# Patient Record
Sex: FEMALE | Race: WHITE | URBAN - METROPOLITAN AREA
[De-identification: names, ages, dates, MRNs, and addresses within clinical notes are randomized per-mention and may not be internally consistent; named-entity substitution may affect disease eponyms.]

---

## 2017-03-24 ENCOUNTER — HOSPITAL ENCOUNTER (EMERGENCY)
Facility: CLINIC | Age: 42
Discharge: HOME | End: 2017-03-24
Attending: FAMILY MEDICINE | Admitting: FAMILY MEDICINE

## 2017-03-24 VITALS
WEIGHT: 190 LBS | DIASTOLIC BLOOD PRESSURE: 83 MMHG | BODY MASS INDEX: 29.98 KG/M2 | RESPIRATION RATE: 18 BRPM | TEMPERATURE: 97.7 F | SYSTOLIC BLOOD PRESSURE: 131 MMHG | OXYGEN SATURATION: 97 %

## 2017-03-24 DIAGNOSIS — S92.504A CLOSED NONDISPLACED FRACTURE OF PHALANX OF LESSER TOE OF RIGHT FOOT, UNSPECIFIED PHALANX, INITIAL ENCOUNTER: ICD-10-CM

## 2017-03-24 PROCEDURE — 99213 OFFICE O/P EST LOW 20 MIN: CPT | Performed by: FAMILY MEDICINE

## 2017-03-24 PROCEDURE — 99213 OFFICE O/P EST LOW 20 MIN: CPT

## 2017-03-24 ASSESSMENT — ENCOUNTER SYMPTOMS
NAUSEA: 0
BLOOD IN STOOL: 0
CONSTIPATION: 0
WHEEZING: 0
HEADACHES: 0
VOMITING: 0
DYSURIA: 0
DIARRHEA: 0
COUGH: 0
FEVER: 0
SHORTNESS OF BREATH: 0
SORE THROAT: 0
FREQUENCY: 0
ABDOMINAL PAIN: 0
PALPITATIONS: 0
DIAPHORESIS: 0
CHILLS: 0
SINUS PRESSURE: 0

## 2017-03-24 NOTE — ED AVS SNAPSHOT
[facility] Emergency Department    [address] [facility] [address]    Phone:  [phone]    Fax:  [phone]                                       Kristen De La Cruz   MRN: [id number]    Department:  [facility] Emergency Department   Date of Visit:  3/24/2017           Patient Information     Date Of Birth          1975        Your diagnoses for this visit were:     Closed nondisplaced fracture of phalanx of lesser toe of right foot, unspecified phalanx, initial encounter follow-up as needed.  use crutches if painful.  avoid repeat trauma to the area.  ibuprofen as needed.       You were seen by Eyad Mireles MD.      Follow-up Information     Follow up with primary doctor In 1 week.        Follow up with [facility] Emergency Department.    Specialty:  EMERGENCY MEDICINE    Why:  As needed, If symptoms worsen    Contact information:    [address] [place] [id number] [phone]    Additional information:    The medical center is located at   [address] [place] [address] (between [facility] and   [facility] in [place], four miles north   of [place]).        Discharge Instructions         ICD-10-CM    1. Closed nondisplaced fracture of phalanx of lesser toe of right foot, unspecified phalanx, initial encounter S92.504A Alum Crutches: Adult    follow-up as needed.  use crutches if painful.  avoid repeat trauma to the area.  ibuprofen as needed.         Closed Toe Fracture  Your big toe is broken (fractured). This causes local pain, swelling, and bruising. This injury takes about 4 weeks to heal. Toe injuries are often treated by taping the injured toe to the next one (buddy taping). This protects the injured toe and holds it in position.    If the toenail has been severely injured, it may fall off in 1 to 2 weeks. It takes up to 12 months for a new toenail to grow back.  Home care  Follow these guidelines when caring for yourself at home:    You may be given a cast shoe to wear  to keep your toe from moving. If not, you can use a sandal or any shoe that doesn t put pressure on the injured toe until the swelling and pain go away. If using a sandal, be careful not to strike your foot against anything. Another injury could make the fracture worse. If you were given crutches, don t put full weight on the injured foot until you can do so without pain.    Keep your foot elevated to reduce pain and swelling. When sleeping, put a pillow under the injured leg. When sitting, support the injured leg so it is level with your waist. This is very important during the first 2 days (48 hours).    Put an ice pack on the injured area. Do this for 20 minutes every 1 to 2 hours the first day for pain relief. You can make an ice pack by wrapping a plastic bag of ice cubes in a thin towel. As the ice melts, be careful that any cloth or paper tape doesn t get wet. Continue using the ice pack 3 to 4 times a day for the next 2 days. Then use the ice pack as needed to ease pain and swelling.    If buddy tape was used and it becomes wet or dirty, change it. You may replace it with paper, plastic, or cloth tape. Cloth tape and paper tapes must be kept dry.    You may use acetaminophen or ibuprofen to control pain, unless another pain medicine was prescribed. If you have chronic liver or kidney disease, talk with your health care provider before using these medicines. Also talk with your provider if you ve had a stomach ulcer or GI bleeding.    You may return to sports or physical education activities after 4 weeks, or when you can run without pain.  Follow-up care  Follow up with your health care provider in 1 week, or as advised. This is to make sure the bone is healing the way it should.  If X-rays were taken, a radiologist will look at them. You will be told of any new findings that may affect your care.  When to seek medical advice  Call your health care provider right away if any of these occur:    Pain or  swelling gets worse    The cast cracks    The cast and padding get wet and stays wet more than 24 hours    Bad odor from the cast or wound fluid stains the cast    Tightness or pressure under the cast gets worse    Toe becomes cold, blue, numb, or tingly    You can t move the toe    Signs of infection: fever, redness, warmth, swelling, or drainage from the wound or cast    Fever of 101 F (38.3 C) or higher, or as directed by your health care provider    7803-9513 [employer]. [address] [place] [address]. All rights reserved. This information is not intended as a substitute for professional medical care. Always follow your healthcare professional's instructions.          24 Hour Appointment Hotline       To make an appointment at any [facility] clinic, call [phone] ([phone]). If you don't have a family doctor or clinic, we will help you find one. [facility] clinics are conveniently located to serve the needs of you and your family.          ED Discharge Orders     Alum Crutches: Adult       Use gait belt during crutch training.                     Review of your medicines      Our records show that you are taking the medicines listed below. If these are incorrect, please call your family doctor or clinic.        Dose / Directions Last dose taken    citalopram 20 MG tablet   Commonly known as:  celeXA   Quantity:  30        1 tablet by mouth daily   Refills:  12        ZOFRAN 4 MG tablet   Quantity:  18   Generic drug:  ondansetron        one to two tablets every 4 hours as needed.   Refills:  0                Orders Needing Specimen Collection     None      Pending Results     No orders found from 3/22/2017 to 3/25/2017.            Pending Culture Results     No orders found from 3/22/2017 to 3/25/2017.             Test Results from your hospital stay            Thank you for choosing [facility]       Thank you for choosing [facility] for your care. Our goal is always to provide you  "with excellent care. Hearing back from our patients is one way we can continue to improve our services. Please take a few minutes to complete the written survey that you may receive in the mail after you visit with us. Thank you!        [employer] Information     [employer] lets you send messages to your doctor, view your test results, renew your prescriptions, schedule appointments and more. To sign up, go to [email or web address] . Click on \"Log in\" on the left side of the screen, which will take you to the Welcome page. Then click on \"Sign up Now\" on the right side of the page.     You will be asked to enter the access code listed below, as well as some personal information. Please follow the directions to create your username and password.     Your access code is: [email or web address]  Expires: 2017  1:25 PM     Your access code will  in 90 days. If you need help or a new code, please call your [facility] clinic or [phone].        Care EveryWhere ID     This is your Care EveryWhere ID. This could be used by other organizations to access your [facility] medical records  [id number]        After Visit Summary       This is your record. Keep this with you and show to your community pharmacist(s) and doctor(s) at your next visit.                  "

## 2017-03-24 NOTE — ED AVS SNAPSHOT
[facility] Emergency Department    [address] [facility] [address]    Phone:  [phone]    Fax:  [phone]                                       Kristen De La Cruz   MRN: [id number]    Department:  [facility] Emergency Department   Date of Visit:  3/24/2017           After Visit Summary Signature Page     I have received my discharge instructions, and my questions have been answered. I have discussed any challenges I see with this plan with the nurse or doctor.    ..........................................................................................................................................  Patient/Patient Representative Signature      ..........................................................................................................................................  Patient Representative Print Name and Relationship to Patient    ..................................................               ................................................  Date                                            Time    ..........................................................................................................................................  Reviewed by Signature/Title    ...................................................              ..............................................  Date                                                            Time

## 2017-03-24 NOTE — ED PROVIDER NOTES
History     Chief Complaint   Patient presents with     Foot Pain     recent fx of toe on right foot, pt continues to have pain and unable to get into ortho next week.     HPI  Kristen De La Cruz is a 41 year old female who presents after injury to the fifth toe on the right foot and seen on 3/17/2017 for the injury.  XRay demonstrated a non-displaced fracture.  using a cast boot and no crutches and has been walking on this since.      also seen on 3/11 for heroin overdose in [facility] system - had admitted to heroin and benzo overuse.  is on chronic clonazepam and xanax    Patient Active Problem List   Diagnosis     Metrorrhagia     Varicose veins of lower extremities with complications     Depressive disorder, not elsewhere classified     Generalized anxiety disorder     CARDIOVASCULAR SCREENING; LDL GOAL LESS THAN 160     Current Outpatient Prescriptions   Medication Sig Dispense Refill     CITALOPRAM HYDROBROMIDE 20 MG OR TABS 1 tablet by mouth daily  30 12     ZOFRAN 4 MG OR TABS one to two tablets every 4 hours as needed. 18 0      No Known Allergies      I have reviewed the Medications, Allergies, Past Medical and Surgical History, and Social History in the Epic system.    Review of Systems   Constitutional: Negative for chills, diaphoresis and fever.   HENT: Negative for ear pain, sinus pressure and sore throat.    Eyes: Negative for visual disturbance.   Respiratory: Negative for cough, shortness of breath and wheezing.    Cardiovascular: Negative for chest pain and palpitations.   Gastrointestinal: Negative for abdominal pain, blood in stool, constipation, diarrhea, nausea and vomiting.   Genitourinary: Negative for dysuria, frequency and urgency.   Skin: Negative for rash.   Neurological: Negative for headaches.   All other systems reviewed and are negative.         Physical Exam   BP: 131/83  Heart Rate: 83  Temp: 97.7  F (36.5  C)  Resp: 18  Weight: 86.2 kg (190 lb)  SpO2: 97 %  Physical Exam    The 5th  distal toe is tender to palpation without obvious ecchymosis or swelling.  There is no deformity.  She has good cap refill distally.  All toes with good range of motion.  There is no tender palpation over the Lisfranc joint or 5th metatarsal base or over the navicular.  Normal distal pulses dorsalis pedis posterior tibial.  Normal coloration.  The ankle is nontender palpation with normal talar tilt and anterior drawer testing.  There is no swelling of the calf or tenderness to palpation over the tib-fib.    ED Course     ED Course     Procedures             Critical Care time:  none               Labs Ordered and Resulted from Time of ED Arrival Up to the Time of Departure from the ED - No data to display    Assessments & Plan (with Medical Decision Making)     MDM: Kristen De La Cruz is a 41 year old female who presented with 5th toe distal pain after she had a nondisplaced fracture seen at [facility].  I cannot see that imaging however I read the discharge paperwork.  She has follow-up with a podiatrist.  She is in a cast boot.  She tells me that she's been walking on this since been associated with increased pain.  I asked her to use crutches to take pressure off this area.  We also discussed that she was evaluated only 5-6 days ago for a heroin overdose at which excessively used both heroin and prescribed benzodiazepines.  She tells me that she was given resources for this as well.      I have reviewed the nursing notes.    I have reviewed the findings, diagnosis, plan and need for follow up with the patient.    New Prescriptions    No medications on file       Final diagnoses:   Closed nondisplaced fracture of phalanx of lesser toe of right foot, unspecified phalanx, initial encounter - follow-up as needed.  use crutches if painful.  avoid repeat trauma to the area.  ibuprofen as needed.       3/24/2017   [facility] EMERGENCY DEPARTMENT     Eyad Mireles MD  03/24/17 8098

## 2017-03-24 NOTE — DISCHARGE INSTRUCTIONS
ICD-10-CM    1. Closed nondisplaced fracture of phalanx of lesser toe of right foot, unspecified phalanx, initial encounter S92.504A Alum Crutches: Adult    follow-up as needed.  use crutches if painful.  avoid repeat trauma to the area.  ibuprofen as needed.         Closed Toe Fracture  Your big toe is broken (fractured). This causes local pain, swelling, and bruising. This injury takes about 4 weeks to heal. Toe injuries are often treated by taping the injured toe to the next one (buddy taping). This protects the injured toe and holds it in position.    If the toenail has been severely injured, it may fall off in 1 to 2 weeks. It takes up to 12 months for a new toenail to grow back.  Home care  Follow these guidelines when caring for yourself at home:    You may be given a cast shoe to wear to keep your toe from moving. If not, you can use a sandal or any shoe that doesn t put pressure on the injured toe until the swelling and pain go away. If using a sandal, be careful not to strike your foot against anything. Another injury could make the fracture worse. If you were given crutches, don t put full weight on the injured foot until you can do so without pain.    Keep your foot elevated to reduce pain and swelling. When sleeping, put a pillow under the injured leg. When sitting, support the injured leg so it is level with your waist. This is very important during the first 2 days (48 hours).    Put an ice pack on the injured area. Do this for 20 minutes every 1 to 2 hours the first day for pain relief. You can make an ice pack by wrapping a plastic bag of ice cubes in a thin towel. As the ice melts, be careful that any cloth or paper tape doesn t get wet. Continue using the ice pack 3 to 4 times a day for the next 2 days. Then use the ice pack as needed to ease pain and swelling.    If buddy tape was used and it becomes wet or dirty, change it. You may replace it with paper, plastic, or cloth tape. Cloth tape and  paper tapes must be kept dry.    You may use acetaminophen or ibuprofen to control pain, unless another pain medicine was prescribed. If you have chronic liver or kidney disease, talk with your health care provider before using these medicines. Also talk with your provider if you ve had a stomach ulcer or GI bleeding.    You may return to sports or physical education activities after 4 weeks, or when you can run without pain.  Follow-up care  Follow up with your health care provider in 1 week, or as advised. This is to make sure the bone is healing the way it should.  If X-rays were taken, a radiologist will look at them. You will be told of any new findings that may affect your care.  When to seek medical advice  Call your health care provider right away if any of these occur:    Pain or swelling gets worse    The cast cracks    The cast and padding get wet and stays wet more than 24 hours    Bad odor from the cast or wound fluid stains the cast    Tightness or pressure under the cast gets worse    Toe becomes cold, blue, numb, or tingly    You can t move the toe    Signs of infection: fever, redness, warmth, swelling, or drainage from the wound or cast    Fever of 101 F (38.3 C) or higher, or as directed by your health care provider    2623-6474 [employer]. [address] [place] [address], [place] [address]. All rights reserved. This information is not intended as a substitute for professional medical care. Always follow your healthcare professional's instructions.

## 2017-03-24 NOTE — ED NOTES
"Patient states \"I was told to come into the urgent care because I couldn't get into the foot specialist till next week and I'm continuing to have pain. I have a fracture in my foot that happened on the 17th.\"   Patient is ambulatory upon arrival to urgent care and is utilizing a post op shoe on her right foot.   "

## 2017-07-29 ENCOUNTER — HEALTH MAINTENANCE LETTER (OUTPATIENT)
Age: 42
End: 2017-07-29

## 2017-08-22 ENCOUNTER — OFFICE VISIT (OUTPATIENT)
Dept: FAMILY MEDICINE | Facility: CLINIC | Age: 42
End: 2017-08-22

## 2017-08-22 ENCOUNTER — TRANSFERRED RECORDS (OUTPATIENT)
Dept: HEALTH INFORMATION MANAGEMENT | Facility: CLINIC | Age: 42
End: 2017-08-22

## 2017-08-22 VITALS
DIASTOLIC BLOOD PRESSURE: 84 MMHG | BODY MASS INDEX: 33.34 KG/M2 | HEART RATE: 80 BPM | OXYGEN SATURATION: 98 % | WEIGHT: 220 LBS | TEMPERATURE: 97.6 F | HEIGHT: 68 IN | SYSTOLIC BLOOD PRESSURE: 124 MMHG | RESPIRATION RATE: 16 BRPM

## 2017-08-22 DIAGNOSIS — G89.29 CHRONIC BILATERAL LOW BACK PAIN WITH RIGHT-SIDED SCIATICA: Primary | ICD-10-CM

## 2017-08-22 DIAGNOSIS — M54.41 CHRONIC BILATERAL LOW BACK PAIN WITH RIGHT-SIDED SCIATICA: Primary | ICD-10-CM

## 2017-08-22 PROCEDURE — 99213 OFFICE O/P EST LOW 20 MIN: CPT | Performed by: OBSTETRICS & GYNECOLOGY

## 2017-08-22 ASSESSMENT — PAIN SCALES - GENERAL: PAINLEVEL: EXTREME PAIN (8)

## 2017-08-22 NOTE — MR AVS SNAPSHOT
After Visit Summary   8/22/2017    Kristen De La Cruz    MRN: [id number]           Patient Information     Date Of Birth          1975        Visit Information        Provider Department      8/22/2017 8:50 AM Joel Alfaro MD [facility]        Today's Diagnoses     Chronic bilateral low back pain with right-sided sciatica    -  1       Follow-ups after your visit        Additional Services     NEUROSURGERY REFERRAL       Your provider has referred you to: [facility]: [facility]uro[facility] Clinic [phone]   [email or web address]    Please be aware that coverage of these services is subject to the terms and limitations of your health insurance plan.  Call member services at your health plan with any benefit or coverage questions.      Please bring the following with you to your appointment:    (1) Any X-Rays, CTs or MRIs which have been performed.  Contact the facility where they were done to arrange for  prior to your scheduled appointment.   (2) List of current medications  (3) This referral request   (4) Any documents/labs given to you for this referral                  Who to contact     If you have questions or need follow up information about today's clinic visit or your schedule please contact [facility] directly at [phone].  Normal or non-critical lab and imaging results will be communicated to you by MyChart, letter or phone within 4 business days after the clinic has received the results. If you do not hear from us within 7 days, please contact the clinic through MyChart or phone. If you have a critical or abnormal lab result, we will notify you by phone as soon as possible.  Submit refill requests through [employer] or call your pharmacy and they will forward the refill request to us. Please allow 3 business days for your refill to be completed.          Additional Information About  "Your Visit        [employer] Information     [employer] lets you send messages to your doctor, view your test results, renew your prescriptions, schedule appointments and more. To sign up, go to [email or web address] . Click on \"Log in\" on the left side of the screen, which will take you to the Welcome page. Then click on \"Sign up Now\" on the right side of the page.     You will be asked to enter the access code listed below, as well as some personal information. Please follow the directions to create your username and password.     Your access code is: [email or web address]  Expires: 2017  9:24 AM     Your access code will  in 90 days. If you need help or a new code, please call your [facility] clinic or [phone].        Care EveryWhere ID     This is your Care EveryWhere ID. This could be used by other organizations to access your [facility] medical records  [id number]        Your Vitals Were     Pulse Temperature Respirations Height Pulse Oximetry Breastfeeding?    80 97.6  F (36.4  C) (Tympanic) 16 5' 8\" (1.727 m) 98% No    BMI (Body Mass Index)                   33.45 kg/m2            Blood Pressure from Last 3 Encounters:   17 124/84   17 131/83   09 114/77    Weight from Last 3 Encounters:   17 220 lb (99.8 kg)   17 190 lb (86.2 kg)   09 204 lb 6.4 oz (92.7 kg)              We Performed the Following     NEUROSURGERY REFERRAL        Primary Care Provider Office Phone # Fax #    Kena Charles [phone] [address] [facility] [address]        Equal Access to Services     MARLENA PATEL : Mingo Marcus, anderson ramirez, blanche mancilla, yessica johnson. So [facility] [phone].    ATENCIÓN: Si habla español, tiene a rodriguez disposición servicios gratuitos de asistencia lingüística. Llame al [phone].    We comply with applicable federal civil rights laws and [place] laws. We do not discriminate " on the basis of race, color, national origin, age, disability sex, sexual orientation or gender identity.            Thank you!     Thank you for choosing [facility]  for your care. Our goal is always to provide you with excellent care. Hearing back from our patients is one way we can continue to improve our services. Please take a few minutes to complete the written survey that you may receive in the mail after your visit with us. Thank you!             Your Updated Medication List - Protect others around you: Learn how to safely use, store and throw away your medicines at www.disposemymeds.org.          This list is accurate as of: 8/22/17  9:26 AM.  Always use your most recent med list.                   Brand Name Dispense Instructions for use Diagnosis    CYCLOBENZAPRINE HCL PO      Take 10 mg by mouth 3 times daily as needed for muscle spasms        NAPROXEN PO      Take 500 mg by mouth 2 times daily (with meals)

## 2017-08-22 NOTE — PROGRESS NOTES
Subjective: she was seen at [facility] several months ago with right sided lower back pain and sciatica with right sided upper thigh pain and tingling- the sx havent improved. She does not recall a precipitating injury or incident.  No loss of bowel or bladder control.       The past medical history, social history, past surgical history and family history as shown below have been reviewed by me today.  History reviewed. No pertinent past medical history.   No Known Allergies  Current Outpatient Prescriptions   Medication Sig Dispense Refill     CYCLOBENZAPRINE HCL PO Take 10 mg by mouth 3 times daily as needed for muscle spasms       NAPROXEN PO Take 500 mg by mouth 2 times daily (with meals)       Past Surgical History:   Procedure Laterality Date     HC CREATE EARDRUM OPENING,GEN ANESTH      P.E. Tubes     SURGICAL HISTORY OF -   01/11/2002    laparoscopic tubal ligation with Filshie clips     Social History     Social History     Marital status:      Spouse name: N/A     Number of children: N/A     Years of education: N/A     Social History Main Topics     Smoking status: Former Smoker     Quit date: 10/1/2006     Smokeless tobacco: Never Used     Alcohol use No     Drug use: No     Sexual activity: Yes     Partners: Male     Birth control/ protection: Surgical     Other Topics Concern     None     Social History Narrative    ** Merged History Encounter **          Family History   Problem Relation Age of Onset     DIABETES Mother      Thyroid Disease Mother      Depression Mother      DIABETES Maternal Grandmother      CANCER Maternal Grandfather      lung     Depression Father      Alzheimer Disease Paternal Grandmother      Depression Brother      Breast Cancer No family hx of      Cancer - colorectal No family hx of        ROS: A 12 point review of systems was done. Except for what is listed above in the HPI, the systems review is negative .      Objective: Vital signs: Blood pressure 124/84, pulse 80,  "temperature 97.6  F (36.4  C), temperature source Tympanic, resp. rate 16, height 5' 8\" (1.727 m), weight 220 lb (99.8 kg), SpO2 98 %, not currently breastfeeding.    HEENT normal.Neck is supple, mobile, no adenopathy or masses palpable. The thyroid feels normal.   Normal range of motion noted.  Chest is clear to auscultation.  No wheezes, rales or rhonchi heard.  Cardiac exam is normal with s1, s2, no murmurs or adventitious sounds.Normal rate and rhythm is heard.   The back is tender in the lower lumbar area on the right side. Also some midline pain to palpation. SLR + at 30 degrees on the right and 60 degrees on the left.  DTRs 1+ on both knees and ankles.  She has normal sensation in legs  And walks without a limp.          Assessment/Plan:    1. Low back pain with sciatica sx to the right knee- and some tingling- I wonder about a disc herniation- right sided- lower lumbar- L3-4 or L4-5    2. I advised  MRI scan to r/o disc herniation before deciding any therapy- she has taken flexeril and naproxen- getting some GI upset from the naproxen so I advised her to stop it.    3. Discussed cauda equina syndrome- rechekc acutely or in ER if loss of bowel or bladder control or if sx worsen.        BRUNA Alfaro MD              "

## 2017-08-22 NOTE — NURSING NOTE
"Chief Complaint   Patient presents with     RECHECK     ER back pain       Initial /84 (BP Location: Right arm, Patient Position: Chair, Cuff Size: Adult Large)  Pulse 80  Temp 97.6  F (36.4  C) (Tympanic)  Resp 16  Ht 5' 8\" (1.727 m)  Wt 220 lb (99.8 kg)  SpO2 98%  Breastfeeding? No  BMI 33.45 kg/m2 Estimated body mass index is 33.45 kg/(m^2) as calculated from the following:    Height as of this encounter: 5' 8\" (1.727 m).    Weight as of this encounter: 220 lb (99.8 kg)..   BP completed using cuff size: large  Medication Rec Completed    Paula Bond CMA    "

## 2017-09-01 ENCOUNTER — OFFICE VISIT (OUTPATIENT)
Dept: NEUROSURGERY | Facility: CLINIC | Age: 42
End: 2017-09-01
Attending: OBSTETRICS & GYNECOLOGY

## 2017-09-01 VITALS
DIASTOLIC BLOOD PRESSURE: 64 MMHG | SYSTOLIC BLOOD PRESSURE: 110 MMHG | BODY MASS INDEX: 33.8 KG/M2 | WEIGHT: 223 LBS | HEIGHT: 68 IN

## 2017-09-01 DIAGNOSIS — M54.50 ACUTE BILATERAL LOW BACK PAIN WITHOUT SCIATICA: Primary | ICD-10-CM

## 2017-09-01 PROCEDURE — 99204 OFFICE O/P NEW MOD 45 MIN: CPT | Performed by: PHYSICIAN ASSISTANT

## 2017-09-01 ASSESSMENT — PAIN SCALES - GENERAL: PAINLEVEL: EXTREME PAIN (8)

## 2017-09-01 NOTE — MR AVS SNAPSHOT
"              After Visit Summary   9/1/2017    Kristen De La Cruz    MRN: [id number]           Patient Information     Date Of Birth          1975        Visit Information        Provider Department      9/1/2017 10:50 AM Malick Bar PA-C [facility]        Today's Diagnoses     Acute bilateral low back pain without sciatica    -  1       Follow-ups after your visit        Additional Services     PHYSICAL THERAPY REFERRAL       *This therapy referral will be filtered to a centralized scheduling office at [facility] and the patient will receive a call to schedule an appointment at a [facility] location most convenient for them. *     [facility] provides Physical Therapy evaluation and treatment and many specialty services across the [facility] system.  If requesting a specialty program, please choose from the list below.    If you have not heard from the scheduling office within 2 business days, please call [phone] for all locations, with the exception of [place], please call [phone].  Treatment: Evaluation & Treatment  Special Instructions/Modalities: back pain, musculoskeletal  Special Programs: whiplash, soft tissue injuries    Please be aware that coverage of these services is subject to the terms and limitations of your health insurance plan.  Call member services at your health plan with any benefit or coverage questions.      **Note to Provider:  If you are referring outside of [facility] for the therapy appointment, please list the name of the location in the \"special instructions\" above, print the referral and give to the patient to schedule the appointment.                  Who to contact     If you have questions or need follow up information about today's clinic visit or your schedule please contact [facility] directly at [phone].  Normal or non-critical lab and imaging results will be communicated to you by Jonatan, " "letter or phone within 4 business days after the clinic has received the results. If you do not hear from us within 7 days, please contact the clinic through [email or web address] or phone. If you have a critical or abnormal lab result, we will notify you by phone as soon as possible.  Submit refill requests through [email or web address] or call your pharmacy and they will forward the refill request to us. Please allow 3 business days for your refill to be completed.          Additional Information About Your Visit        [email or web address] Information     [email or web address] lets you send messages to your doctor, view your test results, renew your prescriptions, schedule appointments and more. To sign up, go to [email or web address] . Click on \"Log in\" on the left side of the screen, which will take you to the Welcome page. Then click on \"Sign up Now\" on the right side of the page.     You will be asked to enter the access code listed below, as well as some personal information. Please follow the directions to create your username and password.     Your access code is: [email or web address]  Expires: 2017  9:24 AM     Your access code will  in 90 days. If you need help or a new code, please call your [facility] clinic or [phone].        Care EveryWhere ID     This is your Care EveryWhere ID. This could be used by other organizations to access your [facility] medical records  [id number]        Your Vitals Were     Height BMI (Body Mass Index)                5' 8\" (1.727 m) 33.91 kg/m2           Blood Pressure from Last 3 Encounters:   17 110/64   17 124/84   17 131/83    Weight from Last 3 Encounters:   17 223 lb (101.2 kg)   17 220 lb (99.8 kg)   17 190 lb (86.2 kg)              We Performed the Following     PHYSICAL THERAPY REFERRAL        Primary Care Provider Office Phone # Fax #    Kena Charles [phone] [address] [facility] [address]        Equal Access to Services     TOSHIA PATEL" AH: Mingo calabrese Jennifernery, waemilida luqadaha, qaybta karea rubentommy, waxmilly david hayluz joaystephanymelissa johnson. So [facility] [phone].    ATENCIÓN: Si habla español, tiene a rodriguez disposición servicios gratuitos de asistencia lingüística. Llame al [phone].    We comply with applicable federal civil rights laws and [place] laws. We do not discriminate on the basis of race, color, national origin, age, disability sex, sexual orientation or gender identity.            Thank you!     Thank you for choosing [facility]  for your care. Our goal is always to provide you with excellent care. Hearing back from our patients is one way we can continue to improve our services. Please take a few minutes to complete the written survey that you may receive in the mail after your visit with us. Thank you!             Your Updated Medication List - Protect others around you: Learn how to safely use, store and throw away your medicines at www.disposemymeds.org.          This list is accurate as of: 9/1/17 11:10 AM.  Always use your most recent med list.                   Brand Name Dispense Instructions for use Diagnosis    CYCLOBENZAPRINE HCL PO      Take 10 mg by mouth 3 times daily as needed for muscle spasms
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
## 2017-09-01 NOTE — PROGRESS NOTES
"Kristen De La Cruz is a 42 year old female who presents for:  Chief Complaint   Patient presents with     Neurologic Problem     Low back pain with right leg pain        Initial Vitals:  /64 (BP Location: Right arm, Patient Position: Chair, Cuff Size: Adult Regular)  Ht 1.727 m (5' 8\")  Wt 101.2 kg (223 lb)  BMI 33.91 kg/m2 Estimated body mass index is 33.91 kg/(m^2) as calculated from the following:    Height as of this encounter: 1.727 m (5' 8\").    Weight as of this encounter: 101.2 kg (223 lb).. Body surface area is 2.2 meters squared. BP completed using cuff size: regular  Extreme Pain (8)    Do you feel safe in your environment?  Yes  Do you need any refills today? No    Nursing Comments:         Melisa Swan CMA  "

## 2017-09-23 ENCOUNTER — HOSPITAL ENCOUNTER (EMERGENCY)
Facility: CLINIC | Age: 42
Discharge: HOME | End: 2017-09-23
Attending: NURSE PRACTITIONER | Admitting: NURSE PRACTITIONER

## 2017-09-23 VITALS
RESPIRATION RATE: 20 BRPM | SYSTOLIC BLOOD PRESSURE: 120 MMHG | TEMPERATURE: 97.6 F | BODY MASS INDEX: 30.31 KG/M2 | HEIGHT: 68 IN | HEART RATE: 117 BPM | OXYGEN SATURATION: 100 % | DIASTOLIC BLOOD PRESSURE: 78 MMHG | WEIGHT: 200 LBS
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
## 2017-09-23 DIAGNOSIS — M62.838 MUSCLE SPASMS OF NECK: ICD-10-CM

## 2017-09-23 LAB
ANION GAP SERPL CALCULATED.3IONS-SCNC: 11 MMOL/L (ref 3–14)
BASOPHILS # BLD AUTO: 0 10E9/L (ref 0–0.2)
BASOPHILS NFR BLD AUTO: 0.3 %
BUN SERPL-MCNC: 16 MG/DL (ref 7–30)
CALCIUM SERPL-MCNC: 8.4 MG/DL (ref 8.5–10.1)
CHLORIDE SERPL-SCNC: 111 MMOL/L (ref 94–109)
CO2 SERPL-SCNC: 24 MMOL/L (ref 20–32)
CREAT SERPL-MCNC: 0.85 MG/DL (ref 0.52–1.04)
CRP SERPL-MCNC: <2.9 MG/L (ref 0–8)
DIFFERENTIAL METHOD BLD: ABNORMAL
EOSINOPHIL # BLD AUTO: 0.3 10E9/L (ref 0–0.7)
EOSINOPHIL NFR BLD AUTO: 3.7 %
ERYTHROCYTE [DISTWIDTH] IN BLOOD BY AUTOMATED COUNT: 12.1 % (ref 10–15)
ERYTHROCYTE [SEDIMENTATION RATE] IN BLOOD BY WESTERGREN METHOD: 6 MM/H (ref 0–20)
GFR SERPL CREATININE-BSD FRML MDRD: 73 ML/MIN/1.7M2
GLUCOSE SERPL-MCNC: 113 MG/DL (ref 70–99)
HCT VFR BLD AUTO: 41 % (ref 35–47)
HGB BLD-MCNC: 14.8 G/DL (ref 11.7–15.7)
IMM GRANULOCYTES # BLD: 0 10E9/L (ref 0–0.4)
IMM GRANULOCYTES NFR BLD: 0.1 %
LYMPHOCYTES # BLD AUTO: 2.8 10E9/L (ref 0.8–5.3)
LYMPHOCYTES NFR BLD AUTO: 32.1 %
MCH RBC QN AUTO: 35.4 PG (ref 26.5–33)
MCHC RBC AUTO-ENTMCNC: 36.1 G/DL (ref 31.5–36.5)
MCV RBC AUTO: 98 FL (ref 78–100)
MONOCYTES # BLD AUTO: 0.8 10E9/L (ref 0–1.3)
MONOCYTES NFR BLD AUTO: 8.5 %
NEUTROPHILS # BLD AUTO: 4.9 10E9/L (ref 1.6–8.3)
NEUTROPHILS NFR BLD AUTO: 55.3 %
PLATELET # BLD AUTO: 239 10E9/L (ref 150–450)
POTASSIUM SERPL-SCNC: 3.5 MMOL/L (ref 3.4–5.3)
RBC # BLD AUTO: 4.18 10E12/L (ref 3.8–5.2)
SODIUM SERPL-SCNC: 146 MMOL/L (ref 133–144)
WBC # BLD AUTO: 8.8 10E9/L (ref 4–11)

## 2017-09-23 PROCEDURE — 99285 EMERGENCY DEPT VISIT HI MDM: CPT | Mod: Z6 | Performed by: NURSE PRACTITIONER

## 2017-09-23 PROCEDURE — 96374 THER/PROPH/DIAG INJ IV PUSH: CPT | Performed by: NURSE PRACTITIONER

## 2017-09-23 PROCEDURE — 85652 RBC SED RATE AUTOMATED: CPT | Performed by: NURSE PRACTITIONER

## 2017-09-23 PROCEDURE — 80048 BASIC METABOLIC PNL TOTAL CA: CPT | Performed by: NURSE PRACTITIONER

## 2017-09-23 PROCEDURE — 86140 C-REACTIVE PROTEIN: CPT | Performed by: NURSE PRACTITIONER

## 2017-09-23 PROCEDURE — 25000132 ZZH RX MED GY IP 250 OP 250 PS 637: Performed by: NURSE PRACTITIONER

## 2017-09-23 PROCEDURE — 85025 COMPLETE CBC W/AUTO DIFF WBC: CPT | Performed by: NURSE PRACTITIONER

## 2017-09-23 PROCEDURE — 25000128 H RX IP 250 OP 636: Performed by: NURSE PRACTITIONER

## 2017-09-23 PROCEDURE — 96375 TX/PRO/DX INJ NEW DRUG ADDON: CPT | Performed by: NURSE PRACTITIONER

## 2017-09-23 PROCEDURE — 99285 EMERGENCY DEPT VISIT HI MDM: CPT | Mod: 25 | Performed by: NURSE PRACTITIONER

## 2017-09-23 RX ORDER — KETOROLAC TROMETHAMINE 30 MG/ML
30 INJECTION, SOLUTION INTRAMUSCULAR; INTRAVENOUS ONCE
Status: COMPLETED | OUTPATIENT
Start: 2017-09-23 | End: 2017-09-23

## 2017-09-23 RX ORDER — HYDROMORPHONE HYDROCHLORIDE 1 MG/ML
0.5 INJECTION, SOLUTION INTRAMUSCULAR; INTRAVENOUS; SUBCUTANEOUS
Status: COMPLETED | OUTPATIENT
Start: 2017-09-23 | End: 2017-09-23

## 2017-09-23 RX ORDER — DIAZEPAM 5 MG
5 TABLET ORAL ONCE
Status: COMPLETED | OUTPATIENT
Start: 2017-09-23 | End: 2017-09-23

## 2017-09-23 RX ORDER — HYDROCODONE BITARTRATE AND ACETAMINOPHEN 5; 325 MG/1; MG/1
1-2 TABLET ORAL EVERY 6 HOURS PRN
Qty: 15 TABLET | Refills: 0 | Status: SHIPPED | OUTPATIENT
Start: 2017-09-23 | End: 2017-12-21

## 2017-09-23 RX ORDER — LIDOCAINE 50 MG/G
2 PATCH TOPICAL ONCE
Status: COMPLETED | OUTPATIENT
Start: 2017-09-23 | End: 2017-09-23

## 2017-09-23 RX ORDER — CYCLOBENZAPRINE HCL 10 MG
10 TABLET ORAL 3 TIMES DAILY PRN
Qty: 30 TABLET | Refills: 0 | Status: SHIPPED | OUTPATIENT
Start: 2017-09-23 | End: 2017-12-21

## 2017-09-23 RX ORDER — DIAZEPAM 10 MG/2ML
5 INJECTION, SOLUTION INTRAMUSCULAR; INTRAVENOUS ONCE
Status: DISCONTINUED | OUTPATIENT
Start: 2017-09-23 | End: 2017-09-23

## 2017-09-23 RX ORDER — PREDNISONE 10 MG/1
TABLET ORAL
Qty: 32 TABLET | Refills: 0 | Status: SHIPPED | OUTPATIENT
Start: 2017-09-23 | End: 2017-10-03

## 2017-09-23 RX ORDER — METHYLPREDNISOLONE SODIUM SUCCINATE 125 MG/2ML
125 INJECTION, POWDER, LYOPHILIZED, FOR SOLUTION INTRAMUSCULAR; INTRAVENOUS ONCE
Status: COMPLETED | OUTPATIENT
Start: 2017-09-23 | End: 2017-09-23

## 2017-09-23 RX ADMIN — LIDOCAINE 2 PATCH: 50 PATCH CUTANEOUS at 21:14

## 2017-09-23 RX ADMIN — METHYLPREDNISOLONE SODIUM SUCCINATE 125 MG: 125 INJECTION, POWDER, FOR SOLUTION INTRAMUSCULAR; INTRAVENOUS at 21:15

## 2017-09-23 RX ADMIN — KETOROLAC TROMETHAMINE 30 MG: 30 INJECTION, SOLUTION INTRAMUSCULAR at 21:12

## 2017-09-23 RX ADMIN — HYDROMORPHONE HYDROCHLORIDE 0.5 MG: 1 INJECTION, SOLUTION INTRAMUSCULAR; INTRAVENOUS; SUBCUTANEOUS at 21:16

## 2017-09-23 RX ADMIN — DIAZEPAM 5 MG: 5 TABLET ORAL at 21:20

## 2017-09-23 ASSESSMENT — ENCOUNTER SYMPTOMS
NECK PAIN: 1
MYALGIAS: 1
ARTHRALGIAS: 1
BACK PAIN: 1

## 2017-09-23 NOTE — ED AVS SNAPSHOT
[facility]    [address] [place] [address]    Phone:  [phone]    Fax:  [phone]                                       Kristen De La Cruz   MRN: [id number]    Department:  [facility]   Date of Visit:  9/23/2017           Patient Information     Date Of Birth          1975        Your diagnoses for this visit were:     Muscle spasms of neck        You were seen by Angie Thomas, MELISSA CNP.      Follow-up Information     Follow up with Kena Charles MD In 1 week.    Specialty:  Family Practice    Contact information:    [address] [facility] [place] [address]  [phone]          Discharge Instructions         Neck Spasm     A spasm of the neck muscles can happen after a sudden awkward neck movement. Sleeping with your neck in a crooked position can also cause spasm. Some people respond to emotional stress by tensing the muscles of their neck, shoulders, and upper back. If neck spasm lasts long enough, it can cause headache.  The treatment described below will usually help the pain to go away in 5 to 7 days. Pain that continues may need further evaluation or other types of treatment such as physical therapy.  Home care    Rest and relax the muscles. Use a comfortable pillow that supports the head and keeps the spine in a neutral position. The position of the head should not be tilted forward or backward. A rolled up towel may help for a custom fit.    Some people find relief with heat. Heat can be applied with either a warm shower or bath or a moist towel heated in the microwave and massage. Others prefer cold packs. You can make an ice pack by filling a plastic bag that seals at the top with ice cubes or crushed ice and then wrapping it with a thin towel. Try both and use the method that feels best for 15 to 20 minutes, several times a day.    Whether using ice or heat, be careful that you do not injure your skin. Never put  ice directly on the skin. Always wrap the ice in a towel or other type of cloth. This is very important, especially in people with poor skin sensations.    Try to reduce your stress level. Emotional stress can lead to neck muscle tension and get in the way of or delay the healing process.    You may use over-the-counter pain medicine to control pain, unless another medicine was prescribed.If you have chronic liver or kidney disease or ever had a stomach ulcer or GI bleeding, talk with your healthcare provider before using these medicines.  Follow-up care  Follow up with your healthcare provider if your symptoms do not show signs of improvement after one week. Physical therapy or further tests may be needed.  If X-rays, CT scans, or MRI scans were taken, you will be told of any new findings that may affect your care.  Call 911  Call 911 if you have:    Sudden weakness or numbness in one or both arms    Neck swelling, difficulty or painful swallowing    Difficulty breathing    Chest pain  When to seek medical advice  Call your healthcare provider right away if any of these occur:    Pain becomes worse or spreads into one or both arms    Increasing headache with nausea or vomiting    Fever of 100.4 F (38 C) or above lasting for 24 to 48 hours  Date Last Reviewed: 11/21/2015 2000-2017 [employer]. [address] [place] [address], [place] [address]. All rights reserved. This information is not intended as a substitute for professional medical care. Always follow your healthcare professional's instructions.      You can continue to take Ibuprofen 600 mg every 6 hours for the next 4-5 days.  Start the Prednisone in the morning.   You can find the lidocaine patches over the counter (4%) if you have found these helpful, you wear these for 12 hours on, then 12 hours off before placing new ones.  Take care and I hope you feel better soon.     24 Hour Appointment Hotline       To make an appointment at any [facility]  clinic, call [phone] ([phone]). If you don't have a family doctor or clinic, we will help you find one. [facility] clinics are conveniently located to serve the needs of you and your family.             Review of your medicines      START taking        Dose / Directions Last dose taken    cyclobenzaprine 10 MG tablet   Commonly known as:  FLEXERIL   Dose:  10 mg   Quantity:  30 tablet        Take 1 tablet (10 mg) by mouth 3 times daily as needed for muscle spasms   Refills:  0        HYDROcodone-acetaminophen 5-325 MG per tablet   Commonly known as:  NORCO   Dose:  1-2 tablet   Quantity:  15 tablet        Take 1-2 tablets by mouth every 6 hours as needed   Refills:  0        predniSONE 10 MG tablet   Commonly known as:  DELTASONE   Quantity:  32 tablet        Take 4 tablets daily for 5 days,  take 2 tablets daily for 3 days, take 1 tablet daily for 3 days, take half a tablet for 3 days.   Refills:  0                Prescriptions were sent or printed at these locations (3 Prescriptions)                   [facility]   [address] [address]    Telephone:  [phone]   Fax:  [phone]   Hours:                  Printed at Department/Unit printer (1 of 3)         HYDROcodone-acetaminophen (NORCO) 5-325 MG per tablet                 These medications are not ready yet because we are checking if your insurance will help you pay for them. Call your pharmacy to confirm that your medication is ready for pickup. It may take up to 24 hours for them to receive the prescription. If the prescription is not ready within 3 business days, please contact your clinic or your provider (2 of 3)         cyclobenzaprine (FLEXERIL) 10 MG tablet               predniSONE (DELTASONE) 10 MG tablet                Procedures and tests performed during your visit     Basic metabolic panel    CBC with platelets differential    CRP inflammation    Erythrocyte sedimentation rate auto     "Peripheral IV: Standard      Orders Needing Specimen Collection     None      Pending Results     No orders found from 2017 to 2017.            Pending Culture Results     No orders found from 2017 to 2017.            Pending Results Instructions     If you had any lab results that were not finalized at the time of your Discharge, you can call the ED Lab Result RN at [phone]. You will be contacted by this team for any positive Lab results or changes in treatment. The nurses are available 7 days a week from 10A to 6:30P.  You can leave a message 24 hours per day and they will return your call.        Thank you for choosing [facility]       Thank you for choosing [facility] for your care. Our goal is always to provide you with excellent care. Hearing back from our patients is one way we can continue to improve our services. Please take a few minutes to complete the written survey that you may receive in the mail after you visit with us. Thank you!        [employer]wite Information     [employer] lets you send messages to your doctor, view your test results, renew your prescriptions, schedule appointments and more. To sign up, go to [email or web address] . Click on \"Log in\" on the left side of the screen, which will take you to the Welcome page. Then click on \"Sign up Now\" on the right side of the page.     You will be asked to enter the access code listed below, as well as some personal information. Please follow the directions to create your username and password.     Your access code is: [email or web address]  Expires: 2017  9:24 AM     Your access code will  in 90 days. If you need help or a new code, please call your [facility] clinic or [phone].        Care EveryWhere ID     This is your Care EveryWhere ID. This could be used by other organizations to access your [facility] medical records  [id number]        Equal Access to Services     TOSHIA SINGH: anderson Flores " blanche ramirez waxay idiin hayaan adeeg kharash la'aan ah. So [facility] [phone].    ATENCIÓN: Si habla nadir, tiene a rodriguez disposición servicios gratuitos de asistencia lingüística. Llame al [phone].    We comply with applicable federal civil rights laws and [place] laws. We do not discriminate on the basis of race, color, national origin, age, disability sex, sexual orientation or gender identity.            After Visit Summary       This is your record. Keep this with you and show to your community pharmacist(s) and doctor(s) at your next visit.

## 2017-09-23 NOTE — LETTER
To Whom it may concern:      Kristen De LaC ruz was seen in our Emergency Department today, 09/23/17.  I expect her condition to improve over the next few days.  she may return to work on Monday, September 25th, 2017.    Thank you.      Sincerely,      MELISSA Wells CNP

## 2017-09-23 NOTE — ED AVS SNAPSHOT
[facility] Emergency Department    [address] [place] [address]    Phone:  [phone]    Fax:  [phone]                                       Kristen De La Cruz   MRN: [id number]    Department:  [facility] Emergency Department   Date of Visit:  9/23/2017           After Visit Summary Signature Page     I have received my discharge instructions, and my questions have been answered. I have discussed any challenges I see with this plan with the nurse or doctor.    ..........................................................................................................................................  Patient/Patient Representative Signature      ..........................................................................................................................................  Patient Representative Print Name and Relationship to Patient    ..................................................               ................................................  Date                                            Time    ..........................................................................................................................................  Reviewed by Signature/Title    ...................................................              ..............................................  Date                                                            Time

## 2017-09-24 NOTE — ED PROVIDER NOTES
"  History     Chief Complaint   Patient presents with     Neck Pain     HPI  Kristen De La Cruz is a 42 year old female who presents to the ED today with c/o left lateral neck pain and upper back pain since last evening.  Patient denies any injury or trauma, does do a lot of lifting at work as well as a lot of work with arms above her head.  Has been taking Ibuprofen without much relief.  Currently pain is an 8/10.  Patient reports that since this morning her pain has increased and she now has limited rotation of her head.  Movement of any kind exacerbates her pain.     I have reviewed the Medications, Allergies, Past Medical and Surgical History, and Social History in the Epic system.    Allergies: No Known Allergies      No current facility-administered medications on file prior to encounter.   No current outpatient prescriptions on file prior to encounter.    Patient Active Problem List   Diagnosis     Metrorrhagia     Varicose veins of lower extremities with complications     Depressive disorder, not elsewhere classified     Generalized anxiety disorder     CARDIOVASCULAR SCREENING; LDL GOAL LESS THAN 160       Past Surgical History:   Procedure Laterality Date     HC CREATE EARDRUM OPENING,GEN ANESTH      P.E. Tubes     SURGICAL HISTORY OF -   01/11/2002    laparoscopic tubal ligation with Filshie clips       Social History   Substance Use Topics     Smoking status: Former Smoker     Packs/day: 0.25     Smokeless tobacco: Never Used     Alcohol use No       Most Recent Immunizations   Administered Date(s) Administered     TDAP Vaccine (Adacel) 05/24/2007       BMI: Estimated body mass index is 30.41 kg/(m^2) as calculated from the following:    Height as of this encounter: 1.727 m (5' 8\").    Weight as of this encounter: 90.7 kg (200 lb).      Review of Systems   Musculoskeletal: Positive for arthralgias, back pain, myalgias and neck pain.   All other systems reviewed and are negative.      Physical Exam   BP: " "125/82  Pulse: 117  Heart Rate: 117  Temp: 97.6  F (36.4  C)  Resp: 16  Height: 172.7 cm (5' 8\")  Weight: 90.7 kg (200 lb)  SpO2: 96 %  Physical Exam   Constitutional: She is oriented to person, place, and time. She appears well-developed and well-nourished. She appears distressed (secondary to pain).   HENT:   Head: Normocephalic.   Mouth/Throat: Oropharynx is clear and moist.   Eyes: Conjunctivae are normal. Pupils are equal, round, and reactive to light.   Neck: Neck supple.   Limited ROM, pain with rotation, no signs of meningismus   Musculoskeletal: Normal range of motion. She exhibits edema and tenderness (left lateral spine, upper mid back). She exhibits no deformity.   Lymphadenopathy:     She has no cervical adenopathy.   Neurological: She is alert and oriented to person, place, and time. She has normal reflexes. No cranial nerve deficit.   Skin: Skin is warm and dry. She is not diaphoretic. No erythema.   Psychiatric: She has a normal mood and affect.       ED Course     ED Course     Procedures  Results for orders placed or performed during the hospital encounter of 09/23/17   CBC with platelets differential   Result Value Ref Range    WBC 8.8 4.0 - 11.0 10e9/L    RBC Count 4.18 3.8 - 5.2 10e12/L    Hemoglobin 14.8 11.7 - 15.7 g/dL    Hematocrit 41.0 35.0 - 47.0 %    MCV 98 78 - 100 fl    MCH 35.4 (H) 26.5 - 33.0 pg    MCHC 36.1 31.5 - 36.5 g/dL    RDW 12.1 10.0 - 15.0 %    Platelet Count 239 150 - 450 10e9/L    Diff Method Automated Method     % Neutrophils 55.3 %    % Lymphocytes 32.1 %    % Monocytes 8.5 %    % Eosinophils 3.7 %    % Basophils 0.3 %    % Immature Granulocytes 0.1 %    Absolute Neutrophil 4.9 1.6 - 8.3 10e9/L    Absolute Lymphocytes 2.8 0.8 - 5.3 10e9/L    Absolute Monocytes 0.8 0.0 - 1.3 10e9/L    Absolute Eosinophils 0.3 0.0 - 0.7 10e9/L    Absolute Basophils 0.0 0.0 - 0.2 10e9/L    Abs Immature Granulocytes 0.0 0 - 0.4 10e9/L   Basic metabolic panel   Result Value Ref Range    Sodium " 146 (H) 133 - 144 mmol/L    Potassium 3.5 3.4 - 5.3 mmol/L    Chloride 111 (H) 94 - 109 mmol/L    Carbon Dioxide 24 20 - 32 mmol/L    Anion Gap 11 3 - 14 mmol/L    Glucose 113 (H) 70 - 99 mg/dL    Urea Nitrogen 16 7 - 30 mg/dL    Creatinine 0.85 0.52 - 1.04 mg/dL    GFR Estimate 73 >60 mL/min/1.7m2    GFR Estimate If Black 89 >60 mL/min/1.7m2    Calcium 8.4 (L) 8.5 - 10.1 mg/dL   CRP inflammation   Result Value Ref Range    CRP Inflammation <2.9 0.0 - 8.0 mg/L   Erythrocyte sedimentation rate auto   Result Value Ref Range    Sed Rate 6 0 - 20 mm/h       Assessments & Plan (with Medical Decision Making)  Kristen is a 42-year-old female who presents to the emergency department today with left lateral neck and upper back pain since last evening.  Please refer to HPI focused exam.  Patient on exam has findings consistent with an acute neck spasm, likely exacerbated with patient's job, repetitive heavy lifting with her arms above her head.    Patient shows no signs of meningismus, she is afebrile.  Patient denies any trauma or unusual activity.  Peripheral IV was established and patient was given IV Solu-Medrol, a dose of Toradol, 0.5 mg of Dilaudid, a dose of oral Valium and 2 lidocaine patches were applied.  Patient reports her pain has improved and is now down to 4 out of 10.  Patient demonstrates mildly improved range of motion.  Blood work was obtained and is reassuring with a normal white count, CRP and ESR.  BMP is within normal limits.    I discussed findings of today's exam and test results with patient, I will send her home with Flexeril for muscle spasms, encouraged scheduled ibuprofen for the next 4-5 days, I did prescribe Norco for severe pain and will also place patient on a tapering dose of prednisone for inflammation.    I discussed narcotic safety in detail with patient, she has no concerns for opioid misuse or abuse according to [place] prescription monitoring program.  He was given a work note that  she can return on Monday, I would like her to be seen by her primary care provider this next week for follow-up, physical therapy or other recommendations can be discussed at that time.  Reasons to return to the ED were discussed, patient is agreeable and was discharged in stable condition with her  driving.       I have reviewed the nursing notes.    I have reviewed the findings, diagnosis, plan and need for follow up with the patient.    New Prescriptions    CYCLOBENZAPRINE (FLEXERIL) 10 MG TABLET    Take 1 tablet (10 mg) by mouth 3 times daily as needed for muscle spasms    HYDROCODONE-ACETAMINOPHEN (NORCO) 5-325 MG PER TABLET    Take 1-2 tablets by mouth every 6 hours as needed    PREDNISONE (DELTASONE) 10 MG TABLET    Take 4 tablets daily for 5 days,  take 2 tablets daily for 3 days, take 1 tablet daily for 3 days, take half a tablet for 3 days.       Final diagnoses:   Muscle spasms of neck       9/23/2017   [facility] EMERGENCY DEPARTMENT     Angie Thomas, MELISSA CNP  09/23/17 1931

## 2017-09-24 NOTE — DISCHARGE INSTRUCTIONS
Neck Spasm     A spasm of the neck muscles can happen after a sudden awkward neck movement. Sleeping with your neck in a crooked position can also cause spasm. Some people respond to emotional stress by tensing the muscles of their neck, shoulders, and upper back. If neck spasm lasts long enough, it can cause headache.  The treatment described below will usually help the pain to go away in 5 to 7 days. Pain that continues may need further evaluation or other types of treatment such as physical therapy.  Home care    Rest and relax the muscles. Use a comfortable pillow that supports the head and keeps the spine in a neutral position. The position of the head should not be tilted forward or backward. A rolled up towel may help for a custom fit.    Some people find relief with heat. Heat can be applied with either a warm shower or bath or a moist towel heated in the microwave and massage. Others prefer cold packs. You can make an ice pack by filling a plastic bag that seals at the top with ice cubes or crushed ice and then wrapping it with a thin towel. Try both and use the method that feels best for 15 to 20 minutes, several times a day.    Whether using ice or heat, be careful that you do not injure your skin. Never put ice directly on the skin. Always wrap the ice in a towel or other type of cloth. This is very important, especially in people with poor skin sensations.    Try to reduce your stress level. Emotional stress can lead to neck muscle tension and get in the way of or delay the healing process.    You may use over-the-counter pain medicine to control pain, unless another medicine was prescribed.If you have chronic liver or kidney disease or ever had a stomach ulcer or GI bleeding, talk with your healthcare provider before using these medicines.  Follow-up care  Follow up with your healthcare provider if your symptoms do not show signs of improvement after one week. Physical therapy or further tests may be  needed.  If X-rays, CT scans, or MRI scans were taken, you will be told of any new findings that may affect your care.  Call 911  Call 911 if you have:    Sudden weakness or numbness in one or both arms    Neck swelling, difficulty or painful swallowing    Difficulty breathing    Chest pain  When to seek medical advice  Call your healthcare provider right away if any of these occur:    Pain becomes worse or spreads into one or both arms    Increasing headache with nausea or vomiting    Fever of 100.4 F (38 C) or above lasting for 24 to 48 hours  Date Last Reviewed: 11/21/2015 2000-2017 [employer]. [address] [place], [place] [id number]. All rights reserved. This information is not intended as a substitute for professional medical care. Always follow your healthcare professional's instructions.      You can continue to take Ibuprofen 600 mg every 6 hours for the next 4-5 days.  Start the Prednisone in the morning.   You can find the lidocaine patches over the counter (4%) if you have found these helpful, you wear these for 12 hours on, then 12 hours off before placing new ones.  Take care and I hope you feel better soon.

## 2017-10-02 ENCOUNTER — NURSE TRIAGE (OUTPATIENT)
Dept: NURSING | Facility: CLINIC | Age: 42
End: 2017-10-02

## 2017-10-03 ENCOUNTER — OFFICE VISIT (OUTPATIENT)
Dept: FAMILY MEDICINE | Facility: CLINIC | Age: 42
End: 2017-10-03

## 2017-10-03 VITALS
HEART RATE: 88 BPM | BODY MASS INDEX: 32.83 KG/M2 | OXYGEN SATURATION: 100 % | DIASTOLIC BLOOD PRESSURE: 78 MMHG | TEMPERATURE: 96.8 F | SYSTOLIC BLOOD PRESSURE: 130 MMHG | WEIGHT: 215.9 LBS

## 2017-10-03 DIAGNOSIS — K04.7 DENTAL INFECTION: Primary | ICD-10-CM

## 2017-10-03 PROCEDURE — 99213 OFFICE O/P EST LOW 20 MIN: CPT | Performed by: FAMILY MEDICINE

## 2017-10-03 RX ORDER — CLINDAMYCIN HCL 300 MG
300 CAPSULE ORAL 3 TIMES DAILY
Qty: 21 CAPSULE | Refills: 0 | Status: SHIPPED | OUTPATIENT
Start: 2017-10-03 | End: 2017-10-10

## 2017-10-03 ASSESSMENT — PAIN SCALES - GENERAL: PAINLEVEL: WORST PAIN (10)

## 2017-10-03 NOTE — LETTER
[address] [address]  [phone]        October 3, 2017    Regarding:  Kristen De La Cruz  [address] [address]              To Whom It May Concern;  Please excuse for 1-2 days while she recovers from illness.          Sincerely,        Rocael Rodriguez MD

## 2017-10-03 NOTE — MR AVS SNAPSHOT
"              After Visit Summary   10/3/2017    Kristen De La Cruz    MRN: [id number]           Patient Information     Date Of Birth          1975        Visit Information        Provider Department      10/3/2017 10:00 AM Rocael Rodriguez MD [facility]        Today's Diagnoses     Dental infection    -  1       Follow-ups after your visit        Who to contact     If you have questions or need follow up information about today's clinic visit or your schedule please contact [facility] directly at [phone].  Normal or non-critical lab and imaging results will be communicated to you by MyChart, letter or phone within 4 business days after the clinic has received the results. If you do not hear from us within 7 days, please contact the clinic through [employer] or phone. If you have a critical or abnormal lab result, we will notify you by phone as soon as possible.  Submit refill requests through [employer] or call your pharmacy and they will forward the refill request to us. Please allow 3 business days for your refill to be completed.          Additional Information About Your Visit        MyChart Information     [employer] lets you send messages to your doctor, view your test results, renew your prescriptions, schedule appointments and more. To sign up, go to [email or web address] . Click on \"Log in\" on the left side of the screen, which will take you to the Welcome page. Then click on \"Sign up Now\" on the right side of the page.     You will be asked to enter the access code listed below, as well as some personal information. Please follow the directions to create your username and password.     Your access code is: [email or web address]  Expires: 2017  9:24 AM     Your access code will  in 90 days. If you need help or a new code, please call your [facility] or [phone].        Care EveryWhere ID     This is your Care EveryWhere ID. This could be used by other " organizations to access your [facility] medical records  [id number]        Your Vitals Were     Pulse Temperature Last Period Pulse Oximetry BMI (Body Mass Index)       88 96.8  F (36  C) (Temporal) 09/08/2017 100% 32.83 kg/m2        Blood Pressure from Last 3 Encounters:   10/03/17 130/78   09/23/17 120/78   09/01/17 110/64    Weight from Last 3 Encounters:   10/03/17 215 lb 14.4 oz (97.9 kg)   09/23/17 200 lb (90.7 kg)   09/01/17 223 lb (101.2 kg)              Today, you had the following     No orders found for display         Today's Medication Changes          These changes are accurate as of: 10/3/17 11:59 PM.  If you have any questions, ask your nurse or doctor.               Start taking these medicines.        Dose/Directions    clindamycin 300 MG capsule   Commonly known as:  CLEOCIN   Used for:  Dental infection   Started by:  Rocael Rodriguez MD        Dose:  300 mg   Take 1 capsule (300 mg) by mouth 3 times daily for 7 days   Quantity:  21 capsule   Refills:  0            Where to get your medicines      These medications were sent to [facility] [address] [place] [address] [address]  [address] [address]     Phone:  [phone]     clindamycin 300 MG capsule                Primary Care Provider Office Phone # Fax #    Kena Purvi Charles [phone] [address] [address]        Equal Access to Services     TOSHIA PATEL AH: Hadii maty moore hadanmolo Soaleali, waaxda luqadaha, qaybta kaalmada adeegyada, waxay david johnson. So [facility] [phone].    ATENCIÓN: Si habla español, tiene a rodriguez disposición servicios gratuitos de asistencia lingüística. Llame al [phone].    We comply with applicable federal civil rights laws and [place] laws. We do not discriminate on the basis of race, color, national origin, age, disability, sex, sexual orientation, or gender identity.            Thank you!     Thank you for choosing [facility]  for your care. Our goal is always to provide you with excellent care. Hearing back from our patients is one way we can continue to improve our services. Please take a few minutes to complete the written survey that you may receive in the mail after your visit with us. Thank you!             Your Updated Medication List - Protect others around you: Learn how to safely use, store and throw away your medicines at www.disposemymeds.org.          This list is accurate as of: 10/3/17 11:59 PM.  Always use your most recent med list.                   Brand Name Dispense Instructions for use Diagnosis    clindamycin 300 MG capsule    CLEOCIN    21 capsule    Take 1 capsule (300 mg) by mouth 3 times daily for 7 days    Dental infection       cyclobenzaprine 10 MG tablet    FLEXERIL    30 tablet    Take 1 tablet (10 mg) by mouth 3 times daily as needed for muscle spasms        HYDROcodone-acetaminophen 5-325 MG per tablet    NORCO    15 tablet    Take 1-2 tablets by mouth every 6 hours as needed        predniSONE 10 MG tablet    DELTASONE    32 tablet    Take 4 tablets daily for 5 days,  take 2 tablets daily for 3 days, take 1 tablet daily for 3 days, take half a tablet for 3 days.

## 2017-10-03 NOTE — TELEPHONE ENCOUNTER
Caller states she has sore gums and teeth; can't see a dentist until insurance comes through  Triage protocol reviewed; Advised to be seen in clinic and evaluated for dental infection    Reason for Disposition    All other mouth symptoms (Exceptions: dry mouth from not drinking enough liquids, chapped lips)    Protocols used: MOUTH SYMPTOMS-ADULT-  Niurka Whitmore RN  FNA

## 2017-10-03 NOTE — NURSING NOTE
"Chief Complaint   Patient presents with     Dental Pain     possible abcess states has had it for about a week can't eat or sleep.  States that her insurance is pending and not able to see the dentist yet.        Initial /78 (BP Location: Right arm, Patient Position: Chair, Cuff Size: Adult Regular)  Pulse 88  Temp 96.8  F (36  C) (Temporal)  Wt 215 lb 14.4 oz (97.9 kg)  LMP 09/08/2017  SpO2 100%  BMI 32.83 kg/m2 Estimated body mass index is 32.83 kg/(m^2) as calculated from the following:    Height as of 9/23/17: 5' 8\" (1.727 m).    Weight as of this encounter: 215 lb 14.4 oz (97.9 kg).  Medication Reconciliation: complete   Randa Love CMA    Health Maintenance Due   Topic Date Due     PAP Q2 YEARS  09/09/2010     TETANUS Q10 YR  05/24/2017     INFLUENZA VACCINE (SYSTEM ASSIGNED)  09/01/2017       Health Maintenance reviewed at today's visit patient asked to schedule/complete:   Patient is aware.       "

## 2017-10-03 NOTE — PROGRESS NOTES
SUBJECTIVE:                                                    Kristen De La Cruz is a 42 year old female who presents to clinic today for the following health issues:    Chief Complaint   Patient presents with     Dental Pain     possible abcess states has had it for about a week can't eat or sleep.  States that her insurance is pending and not able to see the dentist yet.         History as above. Has had dental pain on the left lower jaw for about a week. No fever. No history of dental infections.      ROS:  Constitutional, HEENT, cardiovascular, pulmonary, gi and gu systems are negative, except as otherwise noted.      OBJECTIVE:                                                    /78 (BP Location: Right arm, Patient Position: Chair, Cuff Size: Adult Regular)  Pulse 88  Temp 96.8  F (36  C) (Temporal)  Wt 215 lb 14.4 oz (97.9 kg)  LMP 09/08/2017  SpO2 100%  BMI 32.83 kg/m2  Body mass index is 32.83 kg/(m^2).    Well-appearing female in no distress. Face appears normal. No obvious swelling. Her dentition on both the lower and upper molars on the left side are tender to palpation and percussion. Gums appear normal. No drainage or obvious infection appearing.         ASSESSMENT/PLAN:                                                        ICD-10-CM    1. Dental infection K04.7 clindamycin (CLEOCIN) 300 MG capsule       Try antibiotic, continue with anti-inflammatories, ice as needed. She was given numbers for low cost or free dental care. Because discussed signs of worsening.    Rocael Rodriguez MD  [facility]

## 2017-12-21 ENCOUNTER — HOSPITAL ENCOUNTER (EMERGENCY)
Facility: CLINIC | Age: 42
Discharge: HOME | End: 2017-12-21
Attending: EMERGENCY MEDICINE | Admitting: EMERGENCY MEDICINE

## 2017-12-21 VITALS
OXYGEN SATURATION: 100 % | WEIGHT: 216 LBS | RESPIRATION RATE: 14 BRPM | TEMPERATURE: 97 F | DIASTOLIC BLOOD PRESSURE: 71 MMHG | BODY MASS INDEX: 32.84 KG/M2 | SYSTOLIC BLOOD PRESSURE: 117 MMHG

## 2017-12-21 DIAGNOSIS — R11.2 NAUSEA, VOMITING AND DIARRHEA: ICD-10-CM

## 2017-12-21 DIAGNOSIS — R19.7 NAUSEA, VOMITING AND DIARRHEA: ICD-10-CM

## 2017-12-21 PROCEDURE — 99284 EMERGENCY DEPT VISIT MOD MDM: CPT | Mod: Z6 | Performed by: EMERGENCY MEDICINE

## 2017-12-21 PROCEDURE — 25000125 ZZHC RX 250: Performed by: EMERGENCY MEDICINE

## 2017-12-21 PROCEDURE — 99283 EMERGENCY DEPT VISIT LOW MDM: CPT | Performed by: EMERGENCY MEDICINE

## 2017-12-21 RX ORDER — ONDANSETRON 4 MG/1
4 TABLET, ORALLY DISINTEGRATING ORAL ONCE
Status: COMPLETED | OUTPATIENT
Start: 2017-12-21 | End: 2017-12-21

## 2017-12-21 RX ORDER — ONDANSETRON 4 MG/1
4 TABLET, ORALLY DISINTEGRATING ORAL EVERY 6 HOURS PRN
Qty: 10 TABLET | Refills: 0 | Status: SHIPPED | OUTPATIENT
Start: 2017-12-21 | End: 2017-12-24

## 2017-12-21 RX ADMIN — ONDANSETRON 4 MG: 4 TABLET, ORALLY DISINTEGRATING ORAL at 10:41

## 2017-12-21 NOTE — LETTER
December 21, 2017      To Whom It May Concern:      Kristen De La Cruz was seen in our Emergency Department today, 12/21/17.  Please excuse her from work 12/20/17 and 12/21/17.   I expect her condition to improve over the next 1-2 days.  She may return to work/school when improved.    Sincerely,        Miri Fenton MD

## 2017-12-21 NOTE — DISCHARGE INSTRUCTIONS
Drink plenty of fluids. Frequent small sips.    Zofran if needed for nausea.    Follow-up in clinic if not improving over the next few days and return at any time for worsening, changes or concerns.    I hope that you start feel much better quickly!    Have a very Merry Tere!!

## 2017-12-21 NOTE — ED AVS SNAPSHOT
[facility] Emergency Department    [address]     [place] [address]    Phone:  [phone]    Fax:  [phone]                                       Kristen De La Cruz   MRN: [id number]    Department:  [facility] Emergency Department   Date of Visit:  12/21/2017           Patient Information     Date Of Birth          1975        Your diagnoses for this visit were:     Nausea, vomiting and diarrhea        You were seen by Miri Fenton MD.      Follow-up Information     Follow up with Kena Charles MD.    Specialty:  Family Practice    Contact information:    [address] [address] [phone]          Discharge Instructions       Drink plenty of fluids. Frequent small sips.    Zofran if needed for nausea.    Follow-up in clinic if not improving over the next few days and return at any time for worsening, changes or concerns.    I hope that you start feel much better quickly!    Have a very Merry Tere!!      24 Hour Appointment Hotline       To make an appointment at any [facility], call [phone] ([phone]). If you don't have a family doctor or clinic, we will help you find one. [facility] clinics are conveniently located to serve the needs of you and your family.             Review of your medicines      START taking        Dose / Directions Last dose taken    ondansetron 4 MG ODT tab   Commonly known as:  ZOFRAN ODT   Dose:  4 mg   Quantity:  10 tablet        Take 1 tablet (4 mg) by mouth every 6 hours as needed for nausea or vomiting   Refills:  0                Prescriptions were sent or printed at these locations (1 Prescription)                   [address]   [address] [facility] [address]    Telephone:  [phone]   Fax:  [phone]   Hours:                  E-Prescribed (1 of 1)         ondansetron (ZOFRAN ODT) 4 MG ODT tab                Orders Needing Specimen Collection  "    None      Pending Results     No orders found from 2017 to 2017.            Pending Culture Results     No orders found from 2017 to 2017.            Pending Results Instructions     If you had any lab results that were not finalized at the time of your Discharge, you can call the ED Lab Result RN at [phone]. You will be contacted by this team for any positive Lab results or changes in treatment. The nurses are available 7 days a week from 10A to 6:30P.  You can leave a message 24 hours per day and they will return your call.        Thank you for choosing [facility]       Thank you for choosing [facility] for your care. Our goal is always to provide you with excellent care. Hearing back from our patients is one way we can continue to improve our services. Please take a few minutes to complete the written survey that you may receive in the mail after you visit with us. Thank you!        [employer] Information     [facility] lets you send messages to your doctor, view your test results, renew your prescriptions, schedule appointments and more. To sign up, go to [email or web address] . Click on \"Log in\" on the left side of the screen, which will take you to the Welcome page. Then click on \"Sign up Now\" on the right side of the page.     You will be asked to enter the access code listed below, as well as some personal information. Please follow the directions to create your username and password.     Your access code is: [email or web address]  Expires: 3/21/2018 10:54 AM     Your access code will  in 90 days. If you need help or a new code, please call your [facility] clinic or [phone].        Care EveryWhere ID     This is your Care EveryWhere ID. This could be used by other organizations to access your [facility] medical records  [id number]        Equal Access to Services     TOSHIA PATEL AH: anderson Flores, yessica koch " markie warner ah. So [facility] [phone].    ATENCIÓN: Si habla español, tiene a rodriguez disposición servicios gratuitos de asistencia lingüística. Llame al [phone].    We comply with applicable federal civil rights laws and [place] laws. We do not discriminate on the basis of race, color, national origin, age, disability, sex, sexual orientation, or gender identity.            After Visit Summary       This is your record. Keep this with you and show to your community pharmacist(s) and doctor(s) at your next visit.

## 2017-12-21 NOTE — ED NOTES
Here with a two day history of nausea, vomiting and diarrhea. States she is feeling somewhat better today but needed to be seen before going back to her job at [employer]

## 2017-12-22 NOTE — ED PROVIDER NOTES
"  History     Chief Complaint   Patient presents with     Nausea, Vomiting, & Diarrhea     The history is provided by the patient and medical records.     This is an otherwise healthy 42-year-old female presenting with nausea, vomiting, diarrhea.  Patient started feeling nauseated while she was at work 2 days ago.  Yesterday morning she started throwing up and had difficulty keeping anything down for the entire day.  She had associated diarrhea number of times throughout the day.  Today she continues to have nausea.  No further diarrhea.  She has vomited a couple of times.  She does try to drink some water today.  She starting to feel a little bit weak.  No fevers or chills.  No significant abdominal pain, chest pain, back pain, headache, sore throat, shortness of breath.  She has had decreased urine output.  No blackness or blood in her stools or vomitus.  She has had a number of coworkers with similar symptoms.    Problem List:    Patient Active Problem List    Diagnosis Date Noted     CARDIOVASCULAR SCREENING; LDL GOAL LESS THAN 160 10/31/2010     Priority: Medium     Generalized anxiety disorder 10/29/2009     Priority: Medium     Overview:   9/2016, switched from xanax to klonopin for better coverage and as of 11/2016- doing well on this.  Paxil 10mg.    Diagnosed @ age 27yo, as been on Xanax since @ age 21. Had been on 1mg four times a day but since she moved to [place] 7/16, she was placed on 0.5mg four times a day. At visit 8/16, this was decreased to three time a day and she was started on Paxil 10mg.  Had been on medication for depression in the past but she had side effects on them eg Zoloft, Prozac. Bupar also made her feel \"loopy\".  As of 8/16, denying denying feeling depressed.       Depressive disorder, not elsewhere classified 09/28/2006     Priority: Medium     Varicose veins of lower extremities with complications      Priority: Medium     Problem list name updated by automated process. Provider to " review       Metrorrhagia 01/19/2006     Priority: Medium        Past Medical History:    History reviewed. No pertinent past medical history.    Past Surgical History:    Past Surgical History:   Procedure Laterality Date     HC CREATE EARDRUM OPENING,GEN ANESTH      P.E. Tubes     SURGICAL HISTORY OF -   01/11/2002    laparoscopic tubal ligation with Filshie clips     TONSILLECTOMY         Family History:    Family History   Problem Relation Age of Onset     DIABETES Mother      Thyroid Disease Mother      Depression Mother      DIABETES Maternal Grandmother      CANCER Maternal Grandfather      lung     Depression Father      Alzheimer Disease Paternal Grandmother      Depression Brother      Breast Cancer No family hx of      Cancer - colorectal No family hx of        Social History:  Marital Status:   [2]  Social History   Substance Use Topics     Smoking status: Former Smoker     Packs/day: 0.25     Smokeless tobacco: Never Used     Alcohol use No        Medications:      ondansetron (ZOFRAN ODT) 4 MG ODT tab       Review of Systems   All other ROS reviewed and are negative or non-contributory except as stated in HPI.     Physical Exam   BP: 117/71  Heart Rate: 91  Temp: 97  F (36.1  C)  Resp: 14  Weight: 98 kg (216 lb)  SpO2: 100 %      Physical Exam   Constitutional: She appears well-developed and well-nourished.   Somewhat ill-appearing female sitting in the bed   HENT:   Head: Normocephalic.   Nose: Nose normal.   Tacky mucous membranes   Eyes: Conjunctivae and EOM are normal. No scleral icterus.   Neck: Normal range of motion. Neck supple.   Cardiovascular: Normal rate, regular rhythm, normal heart sounds and intact distal pulses.    Pulmonary/Chest: Effort normal and breath sounds normal.   Abdominal: Soft. There is no tenderness.   Hyperactive bowel sounds   Musculoskeletal: Normal range of motion.   Neurological: She is alert. She exhibits normal muscle tone.   Skin: Skin is warm and dry. She is  not diaphoretic. There is pallor (Slightly).   Psychiatric: She has a normal mood and affect. Her behavior is normal.   Vitals reviewed.      ED Course (with Medical Decision Making)    Pt seen and examined by me.  RN and EPIC notes reviewed.      Patient with nausea, vomiting, diarrhea symptoms.  People at her work with similar symptoms.  This is likely a viral illness.  We discussed options.  She was given Zofran ODT in the ED but did vomit once.  IV was offered but she declines.  She would like to try to manage at home.  An Rx was given for Zofran ODT.  We discussed drinking a lot of fluids, frequent small sips.  She was given a work note.  Follow-up in clinic if not improving and return at anytime for worsening, changes or concerns.       Procedures      Assessments & Plan     I have reviewed the findings, diagnosis, plan and need for follow up with the patient.    Discharge Medication List as of 12/21/2017 10:54 AM      START taking these medications    Details   ondansetron (ZOFRAN ODT) 4 MG ODT tab Take 1 tablet (4 mg) by mouth every 6 hours as needed for nausea or vomiting, Disp-10 tablet, R-0, E-Prescribe             Final diagnoses:   Nausea, vomiting and diarrhea     Disposition: Patient discharged home in stable condition.  Plan as above.  Return for concerns.     Note: Chart documentation done in part with Dragon Voice Recognition software. Although reviewed after completion, some word and grammatical errors may remain.     12/21/2017   [facility] EMERGENCY DEPARTMENT     Miri Fenton MD  12/21/17 1056

## 2018-01-11 ENCOUNTER — HOSPITAL ENCOUNTER (EMERGENCY)
Facility: CLINIC | Age: 43
Discharge: HOME | End: 2018-01-11
Attending: PHYSICIAN ASSISTANT | Admitting: PHYSICIAN ASSISTANT

## 2018-01-11 ENCOUNTER — APPOINTMENT (OUTPATIENT)
Dept: GENERAL RADIOLOGY | Facility: CLINIC | Age: 43
End: 2018-01-11
Attending: PHYSICIAN ASSISTANT

## 2018-01-11 VITALS
DIASTOLIC BLOOD PRESSURE: 63 MMHG | BODY MASS INDEX: 30.41 KG/M2 | OXYGEN SATURATION: 97 % | TEMPERATURE: 99.2 F | SYSTOLIC BLOOD PRESSURE: 107 MMHG | WEIGHT: 200 LBS | RESPIRATION RATE: 18 BRPM | HEART RATE: 93 BPM

## 2018-01-11 DIAGNOSIS — J11.1 INFLUENZA-LIKE ILLNESS: ICD-10-CM

## 2018-01-11 LAB
FLUAV+FLUBV AG SPEC QL: NEGATIVE
FLUAV+FLUBV AG SPEC QL: NEGATIVE
SPECIMEN SOURCE: NORMAL

## 2018-01-11 PROCEDURE — 87804 INFLUENZA ASSAY W/OPTIC: CPT | Performed by: PHYSICIAN ASSISTANT

## 2018-01-11 PROCEDURE — 25000132 ZZH RX MED GY IP 250 OP 250 PS 637: Performed by: PHYSICIAN ASSISTANT

## 2018-01-11 PROCEDURE — 99284 EMERGENCY DEPT VISIT MOD MDM: CPT | Mod: Z6 | Performed by: PHYSICIAN ASSISTANT

## 2018-01-11 PROCEDURE — 99284 EMERGENCY DEPT VISIT MOD MDM: CPT | Mod: 25 | Performed by: PHYSICIAN ASSISTANT

## 2018-01-11 PROCEDURE — 71046 X-RAY EXAM CHEST 2 VIEWS: CPT | Mod: TC

## 2018-01-11 RX ORDER — IBUPROFEN 600 MG/1
600 TABLET, FILM COATED ORAL ONCE
Status: COMPLETED | OUTPATIENT
Start: 2018-01-11 | End: 2018-01-11

## 2018-01-11 RX ADMIN — IBUPROFEN 600 MG: 600 TABLET ORAL at 16:01

## 2018-01-11 ASSESSMENT — ENCOUNTER SYMPTOMS
VOMITING: 1
COUGH: 1
FATIGUE: 1
APPETITE CHANGE: 0
MYALGIAS: 1
CHILLS: 1
NAUSEA: 1
DIAPHORESIS: 1
BACK PAIN: 1
DIARRHEA: 1

## 2018-01-11 NOTE — ED AVS SNAPSHOT
[facility] Emergency Department    [address] [place] [address]    Phone:  [phone]    Fax:  [phone]                                       Kristen De La Cruz   MRN: [id number]    Department:  [facility] Emergency Department   Date of Visit:  1/11/2018           Patient Information     Date Of Birth          1975        Your diagnoses for this visit were:     Influenza-like illness        You were seen by Cruz Carney PA-C.      Follow-up Information     Follow up with [facility] Emergency Department.    Specialty:  EMERGENCY MEDICINE    Why:  As needed, If symptoms worsen    Contact information:    [address] [place] [address] [phone]    Additional information:    From [address]: Exit at [address] on south side of [place]. Turn right on [address]. Turn left at stoplight on [address]. [facility] will be in view two blocks ahead      Discharge References/Attachments     URI, VIRAL, NO ABX (ADULT) (ENGLISH)      24 Hour Appointment Hotline       To make an appointment at any [facility] clinic, call [phone] ([phone]). If you don't have a family doctor or clinic, we will help you find one. [facility] clinics are conveniently located to serve the needs of you and your family.             Review of your medicines      Notice     You have not been prescribed any medications.            Procedures and tests performed during your visit     Influenza A/B antigen    XR Chest 2 Views      Orders Needing Specimen Collection     None      Pending Results     Date and Time Order Name Status Description    1/11/2018 1723 XR Chest 2 Views Preliminary             Pending Culture Results     No orders found from 1/9/2018 to 1/12/2018.            Pending Results Instructions     If you had any lab results that were not finalized at the time of your Discharge, you can call the ED Lab Result RN at [phone]. You will  "be contacted by this team for any positive Lab results or changes in treatment. The nurses are available 7 days a week from 10A to 6:30P.  You can leave a message 24 hours per day and they will return your call.        Thank you for choosing [facility]       Thank you for choosing [facility] for your care. Our goal is always to provide you with excellent care. Hearing back from our patients is one way we can continue to improve our services. Please take a few minutes to complete the written survey that you may receive in the mail after you visit with us. Thank you!        [employer] Information     [employer] lets you send messages to your doctor, view your test results, renew your prescriptions, schedule appointments and more. To sign up, go to [email or web address] . Click on \"Log in\" on the left side of the screen, which will take you to the Welcome page. Then click on \"Sign up Now\" on the right side of the page.     You will be asked to enter the access code listed below, as well as some personal information. Please follow the directions to create your username and password.     Your access code is: [email or web address]  Expires: 3/21/2018 10:54 AM     Your access code will  in 90 days. If you need help or a new code, please call your [facility] clinic or [phone].        Care EveryWhere ID     This is your Care EveryWhere ID. This could be used by other organizations to access your [facility] medical records  [id number]        Equal Access to Services     TOSHIA PATEL : Hadii maty Marcus, waaxda luqadaha, qaybta kaalmada charo, yessica warner . So [facility] [phone].    ATENCIÓN: Si habla español, tiene a rodriguez disposición servicios gratuitos de asistencia lingüística. Llame al [phone].    We comply with applicable federal civil rights laws and [place] laws. We do not discriminate on the basis of race, color, national origin, age, disability, sex, sexual orientation, or " gender identity.            After Visit Summary       This is your record. Keep this with you and show to your community pharmacist(s) and doctor(s) at your next visit.

## 2018-01-11 NOTE — ED AVS SNAPSHOT
[facility] Emergency Department    [address] [place] [address]    Phone:  [phone]    Fax:  [phone]                                       Kristen De La Cruz   MRN: [id number]    Department:  [facility] Emergency Department   Date of Visit:  1/11/2018           After Visit Summary Signature Page     I have received my discharge instructions, and my questions have been answered. I have discussed any challenges I see with this plan with the nurse or doctor.    ..........................................................................................................................................  Patient/Patient Representative Signature      ..........................................................................................................................................  Patient Representative Print Name and Relationship to Patient    ..................................................               ................................................  Date                                            Time    ..........................................................................................................................................  Reviewed by Signature/Title    ...................................................              ..............................................  Date                                                            Time

## 2018-01-11 NOTE — ED PROVIDER NOTES
"  History     Chief Complaint   Patient presents with     Cough     The history is provided by the patient.     Kristen De La Cruz is a 42 year old female who presents to the ED complaining of a cough. The patient states that her symptoms began two days ago with a headache, myalgias, and productive cough with green/ yellow sputum. Her cough is so severe that it causes chest pain. She also notes dizziness, diaphoresis/ chills, fatigue, mild upper back pain, and diarrhea. She experienced emesis yesterday. She was unable to keep fluids down yesterday, but has drank water and juice today without issue. She has been treating her symptoms with NyQuil with some relief. Her  and brother in law were diagnosed with Influenza. She denies any other associated symptoms. She did not have an influenza vaccination this year.         Problem List:    Patient Active Problem List    Diagnosis Date Noted     CARDIOVASCULAR SCREENING; LDL GOAL LESS THAN 160 10/31/2010     Priority: Medium     Generalized anxiety disorder 10/29/2009     Priority: Medium     Overview:   9/2016, switched from xanax to klonopin for better coverage and as of 11/2016- doing well on this.  Paxil 10mg.    Diagnosed @ age 25yo, as been on Xanax since @ age 21. Had been on 1mg four times a day but since she moved to [place] 7/16, she was placed on 0.5mg four times a day. At visit 8/16, this was decreased to three time a day and she was started on Paxil 10mg.  Had been on medication for depression in the past but she had side effects on them eg Zoloft, Prozac. Bupar also made her feel \"loopy\".  As of 8/16, denying denying feeling depressed.       Depressive disorder, not elsewhere classified 09/28/2006     Priority: Medium     Varicose veins of lower extremities with complications      Priority: Medium     Problem list name updated by automated process. Provider to review       Metrorrhagia 01/19/2006     Priority: Medium        Past Medical History:    History " reviewed. No pertinent past medical history.    Past Surgical History:    Past Surgical History:   Procedure Laterality Date     HC CREATE EARDRUM OPENING,GEN ANESTH      P.E. Tubes     SURGICAL HISTORY OF -   01/11/2002    laparoscopic tubal ligation with Filshie clips     TONSILLECTOMY         Family History:    Family History   Problem Relation Age of Onset     DIABETES Mother      Thyroid Disease Mother      Depression Mother      DIABETES Maternal Grandmother      CANCER Maternal Grandfather      lung     Depression Father      Alzheimer Disease Paternal Grandmother      Depression Brother      Breast Cancer No family hx of      Cancer - colorectal No family hx of        Social History:  Marital Status:   [2]  Social History   Substance Use Topics     Smoking status: Current Every Day Smoker     Packs/day: 0.50     Smokeless tobacco: Never Used     Alcohol use No        Medications:      No current outpatient prescriptions on file.      Review of Systems   Constitutional: Positive for chills, diaphoresis and fatigue. Negative for appetite change.   Respiratory: Positive for cough.    Cardiovascular: Positive for chest pain.   Gastrointestinal: Positive for diarrhea, nausea and vomiting.   Musculoskeletal: Positive for back pain and myalgias.   All other systems reviewed and are negative.      Physical Exam   BP: 135/84  Pulse: 93  Temp: 99.2  F (37.3  C)  Resp: 18  Weight: 90.7 kg (200 lb)  SpO2: 97 %      Physical Exam   Constitutional: She is oriented to person, place, and time. No distress.   HENT:   Head: Normocephalic and atraumatic.   Right Ear: Tympanic membrane, external ear and ear canal normal.   Left Ear: Tympanic membrane, external ear and ear canal normal.   Nose: Nose normal.   Mouth/Throat: Oropharynx is clear and moist.   Eyes: Conjunctivae and EOM are normal.   Neck: Normal range of motion. Neck supple.   Cardiovascular: Normal rate, regular rhythm, normal heart sounds and intact distal  pulses.    No murmur heard.  Pulmonary/Chest: Effort normal and breath sounds normal. No respiratory distress. She has no wheezes. She has no rales. She exhibits no tenderness.   Abdominal: Soft. Bowel sounds are normal. She exhibits no distension. There is no tenderness. There is no rebound and no guarding.   Musculoskeletal: Normal range of motion.   Lymphadenopathy:     She has no cervical adenopathy.   Neurological: She is alert and oriented to person, place, and time.   Skin: Skin is warm and dry. No rash noted. She is not diaphoretic. No pallor.   Psychiatric: She has a normal mood and affect. Her behavior is normal.   Nursing note and vitals reviewed.      ED Course     ED Course     Procedures               Critical Care time:  none        Results for orders placed or performed during the hospital encounter of 01/11/18   XR Chest 2 Views    Narrative    CHEST TWO VIEWS   1/11/2018 5:38 PM     HISTORY: Cough, chest discomfort.    COMPARISON: None.      Impression    IMPRESSION: No acute cardiopulmonary disease.   Influenza A/B antigen   Result Value Ref Range    Influenza A/B Agn Specimen Nasopharyngeal     Influenza A Negative NEG^Negative    Influenza B Negative NEG^Negative           Results for orders placed or performed during the hospital encounter of 01/11/18 (from the past 24 hour(s))   Influenza A/B antigen   Result Value Ref Range    Influenza A/B Agn Specimen Nasopharyngeal     Influenza A Negative NEG^Negative    Influenza B Negative NEG^Negative   XR Chest 2 Views    Narrative    CHEST TWO VIEWS   1/11/2018 5:38 PM     HISTORY: Cough, chest discomfort.    COMPARISON: None.      Impression    IMPRESSION: No acute cardiopulmonary disease.       Medications   ibuprofen (ADVIL/MOTRIN) tablet 600 mg (600 mg Oral Given 1/11/18 1601)       Assessments & Plan (with Medical Decision Making)  Influenza-like illness     Kristen is a 42 year old female who presents to the ED complaining of URI symptoms of  fevers, chills, myalgias, headache, chest discomfort, and cough.  Ongoing for 2 days. Exposure to influenza recently with her significant other and also brother. She is afebrile with normal vitals upon presentation to the ED. Her exam incompletely unremarkable. Influenza Swab collected, and this was negative.   I did perform a chest x-ray to ensure that she did not have any underlying pneumonia. Chest x-ray was negative. Given her normal exam and stable vital signs, we discussed supportive care measures. I do think that she likely has an influenza-like illness despite her negative swab today. There is no indication for antibiotic therapy. I recommended ibuprofen 600 mg every 6 hours as needed for discomfort or fevers. Increase clear fluids and increase rest. Use lemon honey tea for a cough suppressant. The patient was in agreement with this plan and was suitable for discharge.      I have reviewed the nursing notes.    I have reviewed the findings, diagnosis, plan and need for follow up with the patient.       There are no discharge medications for this patient.      Final diagnoses:   Influenza-like illness     This document serves as a record of services personally performed by Cruz Carney PA. It was created on their behalf by Randa Cole, a trained medical scribe. The creation of this record is based on the provider's personal observations and the statements of the patient. This document has been checked and approved by the attending provider.    Note: Chart documentation done in part with Dragon Voice Recognition software. Although reviewed after completion, some word and grammatical errors may remain.    1/11/2018   Cruz Carney PA-C   [facility] EMERGENCY DEPARTMENT     Cruz Carney PA-C  01/11/18 2013

## 2018-01-11 NOTE — ED NOTES
Pt w/3 days of HA, cough, vomiting yesterday, diarrhea x 2 daily, body aches, and feeling hot/cold.

## 2018-01-19 ENCOUNTER — TELEPHONE (OUTPATIENT)
Dept: FAMILY MEDICINE | Facility: CLINIC | Age: 43
End: 2018-01-19

## 2018-01-19 NOTE — TELEPHONE ENCOUNTER
Panel Management Review      Patient has the following on her problem list: None      Composite cancer screening  Chart review shows that this patient is due/due soon for the following Pap Smear  Summary:    Patient is due/failing the following:   PAP    Action needed:   Patient needs office visit for PAP.    Type of outreach:    Sent letter.    Questions for provider review:    None                                                                                                                                    Randa Love CMA        Chart routed to Care Team .

## 2018-01-19 NOTE — LETTER
[address] [address]  [phone]        January 19, 2018    Kristen De La Cruz  [address]  [place] [id number]          Dear Kristen,    We were looking through you chart and noticed that you haven't been seen in a while, we would like to schedule an office visit for a PAP when you have time. Please give the clinic a call at [phone] and schedule at your earliest convienence.  Thank you so much, and we look forward to seeing you soon.       From the Office of Rocael Rodriguez MD

## 2018-03-13 ENCOUNTER — TELEPHONE (OUTPATIENT)
Dept: FAMILY MEDICINE | Facility: CLINIC | Age: 43
End: 2018-03-13

## 2018-03-13 NOTE — TELEPHONE ENCOUNTER
Kristen De La Cruz is a 42 year old female who calls with concerns of neck pain, jaw pain, arm pain, chest pain. Swelling in arm.  Started yesterday at work with the arm and is spreading.        RECOMMENDED DISPOSITION:  To ED, another person to drive -in [facility] on her may to [facility].  Will comply with recommendation: Yes  If further questions/concerns or if symptoms do not improve, worsen or new symptoms develop, call your PCP or [facility] as soon as possible.      Guideline used: Neck pain  Telephone Triage Protocols for Nurses, Fifth Edition, Yara Escalante RN

## 2018-03-22 ENCOUNTER — TELEPHONE (OUTPATIENT)
Dept: FAMILY MEDICINE | Facility: CLINIC | Age: 43
End: 2018-03-22

## 2018-03-22 NOTE — TELEPHONE ENCOUNTER
Panel Management Review      Patient has the following on her problem list:     Depression / Dysthymia review    Measure:  Needs PHQ-9 score of 4 or less during index window.  Administer PHQ-9 and if score is 5 or more, send encounter to provider for next steps.    5 - 7 month window range:     PHQ-9 SCORE 9/28/2006 9/9/2008 1/26/2009   Total Score 24 8 22       If PHQ-9 recheck is 5 or more, route to provider for next steps.    Patient is due for:  PHQ9      Composite cancer screening  Chart review shows that this patient is due/due soon for the following Pap Smear  Summary:    Patient is due/failing the following:   PAP and PHQ9    Action needed:   Patient needs office visit for pap.    Type of outreach:    Phone, left message for patient to call back.     Questions for provider review:    None                                                                                                                                    Randa Love CMA      Chart routed to Care Team .